# Patient Record
(demographics unavailable — no encounter records)

---

## 2018-04-24 NOTE — CT
CT HEAD NONCONTRAST:

 

History: Dizziness, syncope. 

 

FINDINGS: 

No comparison. 

 

There is no evidence of acute intracranial hemorrhage or infarct. The ventricles appear normal in siz
e, shape, and position. There is no mass effect or shift of midline structures. Visualized paranasal 
sinuses remain well aerated. 

 

IMPRESSION: 

No acute intracranial abnormalities are demonstrated on noncontrast CT head. 

 

POS: [facility]

## 2018-04-24 NOTE — ULT
PELVIC ULTRASOUND:

(Transabdominal, transvaginal, gray scale, color flow and spectral doppler)

4/24/18

 

HISTORY: 

26-year-old female with right lower quadrant pain. 

 

FINDINGS:  

The uterus measures 7 x 3.1 x 3.5 cm without focal mass or endometrial fluid. The endometrium measure
s 7 mm in thickness. 

 

The right ovary is not visualized. The left ovary measures 2.9 x 2.5 x 2.3 cm and demonstrates flow. 
A 1.5 cm cyst is seen in the left ovary. No right sided adnexal mass is identified. No free fluid is 
seen in the cul-de-sac.

 

IMPRESSION:  

Nonvisualization of the right ovary, otherwise unremarkable exam. 

 

POS: [facility]

## 2018-11-14 NOTE — RAD
FRONTAL RADIOGRAPH CHEST:

 

Date: 11-14-18 

 

History: Sore throat and cough. 

 

FINDINGS: 

Heart and mediastinal contours appear grossly unremarkable. No pneumothorax, pleural fluid, focal con
solidation or alveolar edema. 

 

IMPRESSION: 

No acute findings. 

 

POS: [facility]

## 2019-03-16 NOTE — EKG
Test Reason : 

Blood Pressure : ***/*** mmHG

Vent. Rate : 084 BPM     Atrial Rate : 084 BPM

   P-R Int : 138 ms          QRS Dur : 084 ms

    QT Int : 372 ms       P-R-T Axes : 030 011 028 degrees

   QTc Int : 439 ms

 

Normal sinus rhythm

Normal ECG

 

Confirmed by RADHA FREEMAN ([id number]),  CROW [place] RIO ([id number]) on 3/16/2019 5:02:43 PM

 

Referred By:             Confirmed By:RADHA FREEMAN

## 2019-07-22 NOTE — CT
CT ABDOMEN AND PELVIS WITH CONTRAST:

 

Comparison: None. 

 

History: Intermittent right lower quadrant abdominal pain for one week. 

 

Technique: Multiple contiguous axial images were obtained in a CT of the abdomen and pelvis with cont
rast. Coronal reformats were performed. 

 

FINDINGS: 

Patient is status post cholecystectomy. The uterus is either very small and tilted to the left or the
 patient only has a angela-uterus. The left ovary is visualized. No right ovary is definitely seen. 

 

The liver, kidneys, adrenal glands, spleen, and pancreas are unremarkable. No free air, free fluid, o
r stranding changes are seen in the abdomen or pelvis. The appendix is normal. The large and small sola
wel are unremarkable. 

 

IMPRESSION: 

1. No evidence of acute intraabdominal/pelvic abnormality. 

2. 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 

 

POS: [facility] [address]   [address] [facility] FAX: [phone]    Brief Op Note Template Note    Pre-Op Diagnosis: Peripheral vascular disease, unspecified ([address] [I73.9]    Post-Op Diagnosis:  Peripheral vascular disease, unspecified ([address] [I73.9]    Procedures: Procedure(s):  LEFT ILIAC ANGIOPLASTY AND STENT    Surgeon: Carlin Beltran MD    Assistants: Surgeon(s): Simran Wayne MD      Anesthesia:  General     Findings: Flush occlusion left iliac artery    Tourniquet Time:  * No tourniquets in log *    Estimated Blood Loss:               Specimens:            Implants:  * No implants in log *    Complications: None               Signed: Carlin Beltran MD      Elements of this note have been dictated using speech recognition software. As a result, errors of speech recognition may have occurred.